# Patient Record
Sex: FEMALE | Race: BLACK OR AFRICAN AMERICAN | NOT HISPANIC OR LATINO
[De-identification: names, ages, dates, MRNs, and addresses within clinical notes are randomized per-mention and may not be internally consistent; named-entity substitution may affect disease eponyms.]

---

## 2020-01-13 PROBLEM — Z00.00 ENCOUNTER FOR PREVENTIVE HEALTH EXAMINATION: Status: ACTIVE | Noted: 2020-01-13

## 2020-01-17 ENCOUNTER — APPOINTMENT (OUTPATIENT)
Dept: GYNECOLOGIC ONCOLOGY | Facility: CLINIC | Age: 50
End: 2020-01-17
Payer: COMMERCIAL

## 2020-01-17 VITALS
BODY MASS INDEX: 26.46 KG/M2 | WEIGHT: 155 LBS | DIASTOLIC BLOOD PRESSURE: 80 MMHG | HEIGHT: 64 IN | SYSTOLIC BLOOD PRESSURE: 122 MMHG | TEMPERATURE: 97.7 F

## 2020-01-17 DIAGNOSIS — Z78.9 OTHER SPECIFIED HEALTH STATUS: ICD-10-CM

## 2020-01-17 DIAGNOSIS — Z86.018 PERSONAL HISTORY OF OTHER BENIGN NEOPLASM: ICD-10-CM

## 2020-01-17 PROCEDURE — 99204 OFFICE O/P NEW MOD 45 MIN: CPT

## 2020-01-17 NOTE — OB HISTORY
[Total Preg ___] : : [unfilled] [Full Term ___] : [unfilled] (full-term) [Living ___] : [unfilled] (living) [AB Spont ___] : [unfilled] miscarriage(s) [Definite ___ (Date)] : the last menstrual period was [unfilled] [Menarche Age ____] : age at menarche was [unfilled] [Menopause  Age ____] : menopause occurred at age [unfilled]

## 2020-01-17 NOTE — DISCUSSION/SUMMARY
[FreeTextEntry1] : 49-year-old ( x3) referred by Dr. Jones for second opinion regarding an enlarging fibroid uterus. She complains of pelvic pressure, along with urinary urgency and occasional urinary stress incontinence.\par \par Options for surgical management discussed. Procedures offered patient included laparoscopic hysterectomy with bilateral salpingectomy along with possible bilateral oophorectomy. She is requesting to preserve her ovaries if normal and is opting for  a TLH/BS.\par \par The risk benefits and alternative to the recommended treatments were discussed. She was informed about potential complications of surgery including but not limited to bowel, bladder, and ureteral injuries. Infectious morbidity, along with bleeding and thromboembolic events were also discussed.  \par She showed clear understanding, was given the opportunity to ask questions and is amenable to the above surgical treatment. The patient will be setup for the above procedure in the near future.\par

## 2020-01-17 NOTE — HISTORY OF PRESENT ILLNESS
[FreeTextEntry1] : 49-year-old ( x3) referred by Dr. Jones for second opinion regarding an enlarging fibroid uterus. She complains of pelvic pressure, along with urinary urgency and occasional urinary stress incontinence.\par \par

## 2020-01-21 ENCOUNTER — APPOINTMENT (OUTPATIENT)
Dept: SURGERY | Facility: CLINIC | Age: 50
End: 2020-01-21
Payer: COMMERCIAL

## 2020-01-21 VITALS
WEIGHT: 155 LBS | SYSTOLIC BLOOD PRESSURE: 120 MMHG | DIASTOLIC BLOOD PRESSURE: 78 MMHG | BODY MASS INDEX: 26.46 KG/M2 | HEIGHT: 64 IN

## 2020-01-21 DIAGNOSIS — D17.20 BENIGN LIPOMATOUS NEOPLASM OF SKIN AND SUBCUTANEOUS TISSUE OF UNSPECIFIED LIMB: ICD-10-CM

## 2020-01-21 DIAGNOSIS — R92.2 INCONCLUSIVE MAMMOGRAM: ICD-10-CM

## 2020-01-21 PROCEDURE — 99243 OFF/OP CNSLTJ NEW/EST LOW 30: CPT

## 2020-01-21 NOTE — ASSESSMENT
[FreeTextEntry1] : After examination patient was explained that her breast examination is normal. 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 Further treatment will be based on the findings of the sonogram. Followup in the office as needed.

## 2020-01-21 NOTE — CONSULT LETTER
[Dear  ___] : Dear  [unfilled], [Consult Letter:] : I had the pleasure of evaluating your patient, [unfilled]. [Please see my note below.] : Please see my note below. [Consult Closing:] : Thank you very much for allowing me to participate in the care of this patient.  If you have any questions, please do not hesitate to contact me. [Sincerely,] : Sincerely, [FreeTextEntry3] : Polo Carlos MD, FACS\par [facility]\[place] [address]\par

## 2020-01-21 NOTE — PAST MEDICAL HISTORY
[Perimenopausal] : The patient is perimenopausal [Menarche Age ____] : age at menarche was [unfilled] [Approximately ___] : the LMP was approximately [unfilled] [Irregular Cycle Intervals] : are  irregular [Total Preg ___] : G[unfilled] [Live Births ___] : P[unfilled]  [Full Term ___] : Full Term: [unfilled] [Living ___] : Living: [unfilled] [Age At Live Birth ___] : Age at live birth: [unfilled] [History of Hormone Replacement Treatment] : has no history of hormone replacement treatment [FreeTextEntry6] : 13 yrs ago  [FreeTextEntry7] : age [age]

## 2020-01-21 NOTE — HISTORY OF PRESENT ILLNESS
[FreeTextEntry1] : Patient presents to the office with a history of right axillary mass. No history of trauma. She has this mass which is not increasing in size.

## 2020-01-21 NOTE — DATA REVIEWED
[FreeTextEntry1] : Patient's recent mammography report from 1 2 2020 was noted. There is no significant mass calcification or other findings in either breast.

## 2021-04-04 ENCOUNTER — LABORATORY RESULT (OUTPATIENT)
Age: 51
End: 2021-04-04

## 2021-04-05 ENCOUNTER — APPOINTMENT (OUTPATIENT)
Dept: OBGYN | Facility: CLINIC | Age: 51
End: 2021-04-05
Payer: COMMERCIAL

## 2021-04-05 VITALS
HEART RATE: 80 BPM | WEIGHT: 162 LBS | DIASTOLIC BLOOD PRESSURE: 70 MMHG | OXYGEN SATURATION: 98 % | TEMPERATURE: 98 F | HEIGHT: 64 IN | SYSTOLIC BLOOD PRESSURE: 110 MMHG | BODY MASS INDEX: 27.66 KG/M2

## 2021-04-05 DIAGNOSIS — Z98.890 OTHER SPECIFIED POSTPROCEDURAL STATES: ICD-10-CM

## 2021-04-05 DIAGNOSIS — D21.9 BENIGN NEOPLASM OF CONNECTIVE AND OTHER SOFT TISSUE, UNSPECIFIED: ICD-10-CM

## 2021-04-05 DIAGNOSIS — Z01.419 ENCOUNTER FOR GYNECOLOGICAL EXAMINATION (GENERAL) (ROUTINE) W/OUT ABNORMAL FINDINGS: ICD-10-CM

## 2021-04-05 DIAGNOSIS — Z82.49 FAMILY HISTORY OF ISCHEMIC HEART DISEASE AND OTHER DISEASES OF THE CIRCULATORY SYSTEM: ICD-10-CM

## 2021-04-05 LAB
BILIRUB UR QL STRIP: NORMAL
GLUCOSE UR-MCNC: NORMAL
HGB UR QL STRIP.AUTO: NORMAL
KETONES UR-MCNC: NORMAL
LEUKOCYTE ESTERASE UR QL STRIP: NORMAL
NITRITE UR QL STRIP: NORMAL
PROT UR STRIP-MCNC: NORMAL

## 2021-04-05 PROCEDURE — 99396 PREV VISIT EST AGE 40-64: CPT

## 2021-04-05 PROCEDURE — 99072 ADDL SUPL MATRL&STAF TM PHE: CPT

## 2021-04-05 PROCEDURE — 81003 URINALYSIS AUTO W/O SCOPE: CPT | Mod: NC,QW

## 2021-04-05 NOTE — PHYSICAL EXAM
[Examination Of The Breasts] : a normal appearance [No Masses] : no breast masses were palpable [Labia Majora] : normal [Normal] : normal [Enlarged ___ wks] : enlarged [unfilled] ~Uweeks [Uterine Adnexae] : normal [Declined] : Patient declined rectal exam [FreeTextEntry6] : hx multiple fibroids

## 2021-04-05 NOTE — HISTORY OF PRESENT ILLNESS
[Patient reported mammogram was normal] : Patient reported mammogram was normal [Patient reported PAP Smear was normal] : Patient reported PAP Smear was normal [Gonorrhea test offered] : Gonorrhea test offered [Chlamydia test offered] : Chlamydia test offered [Trichomonas test offered] : Trichomonas test offered [HPV test offered] : HPV test offered [perimenopausal] : perimenopausal [N] : Patient does not use contraception [Y] : Patient is sexually active [FreeTextEntry1] : pt present for gyn exam [TextBox_4] : Pt present for annual gyn exam; has hx of multiple fibroids [Mammogramdate] : 1/2/2020 [PapSmeardate] : 1/2/2020 [GonorrheaDate] : 1/2/2020 [ChlamydiaDate] : 1/2/2020 [TrichomonasDate] : 1/2/2020 [HPVDate] : 1/2/2020 [LMPDate] : 1/21 [PGHxTotal] : 4 [[facility] Center[facility]] : 3 [PGHxABInduced] : 1

## 2021-04-05 NOTE — COUNSELING
[Nutrition/ Exercise/ Weight Management] : nutrition, exercise, weight management [Body Image] : body image [Vitamins/Supplements] : vitamins/supplements [Breast Self Exam] : breast self exam [Lab Results] : lab results

## 2021-04-05 NOTE — PLAN
[FreeTextEntry1] : Pap, GC, Chlamydia done today; TVS for assessment of fibroids completed and results pending.  \par UA C&S\par Colonoscopy\par Mammo\par Hormone profile\par RTO 6 mos/prn

## 2021-04-21 ENCOUNTER — LABORATORY RESULT (OUTPATIENT)
Age: 51
End: 2021-04-21

## 2022-06-02 ENCOUNTER — LABORATORY RESULT (OUTPATIENT)
Age: 52
End: 2022-06-02

## 2022-06-03 ENCOUNTER — APPOINTMENT (OUTPATIENT)
Dept: OBGYN | Facility: CLINIC | Age: 52
End: 2022-06-03
Payer: COMMERCIAL

## 2022-06-03 VITALS
HEART RATE: 90 BPM | HEIGHT: 64 IN | BODY MASS INDEX: 26.63 KG/M2 | DIASTOLIC BLOOD PRESSURE: 70 MMHG | WEIGHT: 156 LBS | SYSTOLIC BLOOD PRESSURE: 110 MMHG | TEMPERATURE: 98 F | OXYGEN SATURATION: 99 %

## 2022-06-03 LAB
BILIRUB UR QL STRIP: NEGATIVE
CLARITY UR: CLEAR
COLLECTION METHOD: NORMAL
GLUCOSE UR-MCNC: NEGATIVE
HCG UR QL: 0.2 EU/DL
HGB UR QL STRIP.AUTO: NORMAL
KETONES UR-MCNC: NEGATIVE
LEUKOCYTE ESTERASE UR QL STRIP: NEGATIVE
NITRITE UR QL STRIP: NEGATIVE
PH UR STRIP: 5.5
PROT UR STRIP-MCNC: NEGATIVE
SP GR UR STRIP: 1.03

## 2022-06-03 PROCEDURE — 81003 URINALYSIS AUTO W/O SCOPE: CPT | Mod: NC,QW

## 2022-06-03 PROCEDURE — 99213 OFFICE O/P EST LOW 20 MIN: CPT

## 2022-06-03 NOTE — HISTORY OF PRESENT ILLNESS
[Gonorrhea test offered] : Gonorrhea test offered [Chlamydia test offered] : Chlamydia test offered [Trichomonas test offered] : Trichomonas test offered [HPV test offered] : HPV test offered [Ultrasound] : ultrasound [Yes] : pregnancy [Mammogramdate] : 11/26/21 [BreastSonogramDate] : -0- [PapSmeardate] : 4/5/21 [BoneDensityDate] : -0- [ColonoscopyDate] : -0- [LMPDate] : 3/21 [PGHxTotal] : 4 [[facility]Term] : 3 [[facility] Centeriving] : 3 [PGHxABInduced] : 1 [TextBox_27] : TVS/ PELVIC US- 4/5/21 [TextBox_6] : 3/21 [TextBox_9] : 12 [TextBox_13] : 30 [TextBox_15] : 5 [FreeTextEntry1] : 3/21

## 2022-06-03 NOTE — PHYSICAL EXAM
[Chaperone Present] : A chaperone was present in the examining room during all aspects of the physical examination [Examination Of The Breasts] : a normal appearance [No Masses] : no breast masses were palpable [Labia Majora] : normal [Labia Minora] : normal [Normal] : normal [Retroversion] : retroverted [Enlarged ___ wks] : enlarged [unfilled] ~Uweeks [Uterine Adnexae] : normal

## 2022-09-01 ENCOUNTER — APPOINTMENT (OUTPATIENT)
Dept: OBGYN | Facility: CLINIC | Age: 52
End: 2022-09-01

## 2022-12-06 ENCOUNTER — TRANSCRIPTION ENCOUNTER (OUTPATIENT)
Age: 52
End: 2022-12-06

## 2023-01-06 ENCOUNTER — APPOINTMENT (OUTPATIENT)
Dept: OBGYN | Facility: CLINIC | Age: 53
End: 2023-01-06
Payer: COMMERCIAL

## 2023-01-06 VITALS
HEIGHT: 64 IN | OXYGEN SATURATION: 98 % | TEMPERATURE: 98 F | DIASTOLIC BLOOD PRESSURE: 62 MMHG | WEIGHT: 160 LBS | SYSTOLIC BLOOD PRESSURE: 114 MMHG | BODY MASS INDEX: 27.31 KG/M2 | HEART RATE: 74 BPM

## 2023-01-06 DIAGNOSIS — N83.209 UNSPECIFIED OVARIAN CYST, UNSPECIFIED SIDE: ICD-10-CM

## 2023-01-06 DIAGNOSIS — D25.9 LEIOMYOMA OF UTERUS, UNSPECIFIED: ICD-10-CM

## 2023-01-06 PROCEDURE — 99213 OFFICE O/P EST LOW 20 MIN: CPT

## 2023-01-06 PROCEDURE — 81003 URINALYSIS AUTO W/O SCOPE: CPT | Mod: QW

## 2023-01-06 NOTE — PLAN
[FreeTextEntry1] : gyn exam  \par tvs \par b/l mammo \par recommend colonoscopy \par  \par ca with vit d \par increase po fluids \par f/u results \par rto 2-3 mths/prn

## 2023-01-06 NOTE — HISTORY OF PRESENT ILLNESS
[FreeTextEntry1] : pt present for follow up irregulkar bleeding and fibroids [TextBox_4] : pt presents for f/u fibroids \par pt states she no longer has any bleeding and denies pain \par  [Mammogramdate] : 4/2021 [PapSmeardate] : 6/3/2022 [BoneDensityDate] : 0 [ColonoscopyDate] : 0

## 2023-01-06 NOTE — PHYSICAL EXAM
[Chaperone Present] : A chaperone was present in the examining room during all aspects of the physical examination [Appropriately responsive] : appropriately responsive [Alert] : alert [No Acute Distress] : no acute distress [Oriented x3] : oriented x3 [Labia Majora] : normal [Labia Minora] : normal [Normal] : normal [Uterine Adnexae] : normal [No Tenderness] : no tenderness [Normal rectal exam] : was normal

## 2023-03-02 LAB — CANCER AG125 SERPL-ACNC: 8 U/ML

## 2024-02-01 ENCOUNTER — APPOINTMENT (OUTPATIENT)
Dept: GASTROENTEROLOGY | Facility: CLINIC | Age: 54
End: 2024-02-01

## 2024-08-02 ENCOUNTER — APPOINTMENT (OUTPATIENT)
Dept: OBGYN | Facility: CLINIC | Age: 54
End: 2024-08-02
Payer: COMMERCIAL

## 2024-08-02 VITALS
OXYGEN SATURATION: 98 % | WEIGHT: 165 LBS | TEMPERATURE: 98.6 F | SYSTOLIC BLOOD PRESSURE: 116 MMHG | HEIGHT: 64 IN | DIASTOLIC BLOOD PRESSURE: 76 MMHG | BODY MASS INDEX: 28.17 KG/M2 | HEART RATE: 78 BPM

## 2024-08-02 DIAGNOSIS — Z01.419 ENCOUNTER FOR GYNECOLOGICAL EXAMINATION (GENERAL) (ROUTINE) W/OUT ABNORMAL FINDINGS: ICD-10-CM

## 2024-08-02 DIAGNOSIS — Z11.3 ENCOUNTER FOR SCREENING FOR INFECTIONS WITH A PREDOMINANTLY SEXUAL MODE OF TRANSMISSION: ICD-10-CM

## 2024-08-02 DIAGNOSIS — D25.9 LEIOMYOMA OF UTERUS, UNSPECIFIED: ICD-10-CM

## 2024-08-02 PROCEDURE — 81003 URINALYSIS AUTO W/O SCOPE: CPT | Mod: QW

## 2024-08-02 PROCEDURE — 99396 PREV VISIT EST AGE 40-64: CPT

## 2024-08-02 NOTE — PROCEDURE
[Cervical Pap Smear] : cervical Pap smear [Liquid Base] : liquid base [GC & Chlamydia via Pap] : GC & Chlamydia via Pap [Tolerated Well] : the patient tolerated the procedure well [No Complications] : there were no complications [de-identified] : HPV

## 2024-08-02 NOTE — HISTORY OF PRESENT ILLNESS
[Patient reported mammogram was normal] : Patient reported mammogram was normal [FreeTextEntry1] : pt present for annual exam  [TextBox_4] : pt presents for annual with no complaints  pt reports having colonoscopy scheduled end of this month  [Mammogramdate] : 2023

## 2024-08-02 NOTE — PLAN
[FreeTextEntry1] : annual with pap/cx/hpv  TVS B/L mammo  Bone Density  recommend colonoscopy  ca with vit d/c/B6/ increase po fluids  healthy diet/exercise rto 6mths and prn

## 2024-08-06 LAB
C TRACH RRNA SPEC QL NAA+PROBE: NOT DETECTED
HPV HIGH+LOW RISK DNA PNL CVX: NOT DETECTED
N GONORRHOEA RRNA SPEC QL NAA+PROBE: NOT DETECTED
SOURCE AMPLIFICATION: NORMAL
SOURCE AMPLIFICATION: NORMAL
T VAGINALIS RRNA SPEC QL NAA+PROBE: NOT DETECTED

## 2024-08-07 LAB — CYTOLOGY CVX/VAG DOC THIN PREP: NORMAL

## 2024-08-16 ENCOUNTER — APPOINTMENT (OUTPATIENT)
Dept: SURGERY | Facility: CLINIC | Age: 54
End: 2024-08-16
Payer: COMMERCIAL

## 2024-08-16 VITALS
BODY MASS INDEX: 28.17 KG/M2 | HEART RATE: 75 BPM | OXYGEN SATURATION: 99 % | HEIGHT: 64 IN | SYSTOLIC BLOOD PRESSURE: 120 MMHG | WEIGHT: 165 LBS | DIASTOLIC BLOOD PRESSURE: 70 MMHG

## 2024-08-16 DIAGNOSIS — D17.20 BENIGN LIPOMATOUS NEOPLASM OF SKIN AND SUBCUTANEOUS TISSUE OF UNSPECIFIED LIMB: ICD-10-CM

## 2024-08-16 PROCEDURE — 99204 OFFICE O/P NEW MOD 45 MIN: CPT

## 2024-08-19 NOTE — HISTORY OF PRESENT ILLNESS
[de-identified] : Ms. LEI CASTANEDA is a 54 year  old woman. She presented to the office for the first time on 08/19/2024 , her primary is GREGORIO CASTILLO . no major pmh/psh right axillary mass right back of thigh mass soft and grwoing

## 2024-08-19 NOTE — PHYSICAL EXAM
[JVD] : no jugular venous distention  [Purpura] : no purpura  [Alert] : alert [de-identified] : Normal  [de-identified] : Normal  [de-identified] : Normal  [de-identified] : 3 cm back of axilla and back of thigh mass

## 2024-08-19 NOTE — ASSESSMENT
[FreeTextEntry1] : Ms. LEI CASTANEDA is a 54 year  old woman. She presented to the office for the first time on 08/19/2024 , her primary is GREGORIO CASTILLO . no major pmh/psh right axillary mass right back of thigh mass soft and growing  exam : 3 cm back of axilla and back of thigh mass imprssin : tumor likely benign  consented for excision of back of axilla and thigh mass - right   she does not want the procedure done today / office  The various options were explained to the patient. The Risk , benefit and alternatives were discussed. We discussed recovery and possible complications. We explained in great detail the pathophysiology of the disease process. We brett diagrams and discussed the workup for diagnosis and management. The Post operative care was explained to the patient. She was counselled on diet , exercise and wound care. We discussed the pathology and surgery with her.   We discussed for over 45  min, including her present medical conditions, medications and if they need to be changed, the medications she is on and various surgical and non-surgical options. The Risk, benefit and alternatives were discussed. We discussed recovery and possible complications. her radiological images and labs were independently reviewed in the PACS by me. The Images were reviewed with her .   We discussed the importance of close follow up. We informed that she needs to follow up in [place] . We also informed that she can call us if anything changes or has any questions.     Noe Agosto MD Minimally Invasive Surgery Foregut and [facility]  [facility]. [address] , [address], [place] [address] Phone: [phone] Fax: [phone]

## 2024-08-21 ENCOUNTER — NON-APPOINTMENT (OUTPATIENT)
Age: 54
End: 2024-08-21

## 2024-10-02 ENCOUNTER — APPOINTMENT (OUTPATIENT)
Dept: SURGERY | Facility: CLINIC | Age: 54
End: 2024-10-02